# Patient Record
Sex: FEMALE | Race: WHITE | URBAN - NONMETROPOLITAN AREA
[De-identification: names, ages, dates, MRNs, and addresses within clinical notes are randomized per-mention and may not be internally consistent; named-entity substitution may affect disease eponyms.]

---
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
## 2025-02-27 ENCOUNTER — HOSPITAL ENCOUNTER (OUTPATIENT)
Age: 20
Discharge: HOME | End: 2025-02-27
Payer: COMMERCIAL

## 2025-02-27 LAB — HCG UR QL: NEGATIVE

## 2025-02-27 PROCEDURE — 81025 URINE PREGNANCY TEST: CPT

## 2025-04-04 ENCOUNTER — APPOINTMENT (OUTPATIENT)
Dept: GENERAL RADIOLOGY | Age: 20
End: 2025-04-04
Payer: COMMERCIAL

## 2025-04-04 ENCOUNTER — HOSPITAL ENCOUNTER (EMERGENCY)
Age: 20
Discharge: HOME | End: 2025-04-04
Payer: COMMERCIAL

## 2025-04-04 VITALS
DIASTOLIC BLOOD PRESSURE: 45 MMHG | HEIGHT: 63 IN | WEIGHT: 225 LBS | SYSTOLIC BLOOD PRESSURE: 103 MMHG | BODY MASS INDEX: 39.87 KG/M2 | HEART RATE: 49 BPM | RESPIRATION RATE: 18 BRPM | OXYGEN SATURATION: 90 % | TEMPERATURE: 97.7 F

## 2025-04-04 DIAGNOSIS — N39.0 URINARY TRACT INFECTION WITHOUT HEMATURIA, SITE UNSPECIFIED: ICD-10-CM

## 2025-04-04 DIAGNOSIS — R42 LIGHTHEADED: Primary | ICD-10-CM

## 2025-04-04 LAB
ALBUMIN SERPL BCG-MCNC: 4.1 G/DL (ref 3.4–4.9)
ALP SERPL-CCNC: 112 U/L (ref 38–126)
ALT SERPL W/O P-5'-P-CCNC: 27 U/L (ref 10–35)
ANION GAP SERPL CALC-SCNC: 9 MEQ/L (ref 8–16)
AST SERPL-CCNC: 26 U/L (ref 10–35)
B-HCG SERPL QL: NEGATIVE
BACTERIA URNS QL MICRO: ABNORMAL /HPF
BASOPHILS ABSOLUTE: 0 THOU/MM3 (ref 0–0.1)
BASOPHILS NFR BLD AUTO: 0.2 %
BILIRUB SERPL-MCNC: 0.2 MG/DL (ref 0.3–1.2)
BILIRUB UR QL STRIP.AUTO: NEGATIVE
BUN SERPL-MCNC: 12 MG/DL (ref 8–23)
CALCIUM SERPL-MCNC: 9.7 MG/DL (ref 8.6–10)
CASTS #/AREA URNS LPF: ABNORMAL /LPF
CASTS 2: ABNORMAL /LPF
CHARACTER UR: ABNORMAL
CHLORIDE SERPL-SCNC: 104 MEQ/L (ref 98–111)
CO2 SERPL-SCNC: 26 MEQ/L (ref 22–29)
COLOR, UA: YELLOW
CREAT SERPL-MCNC: 0.8 MG/DL (ref 0.5–0.9)
CRYSTALS URNS MICRO: ABNORMAL
DEPRECATED RDW RBC AUTO: 36.9 FL (ref 35–45)
EKG ATRIAL RATE: 54 BPM
EKG P AXIS: 34 DEGREES
EKG P-R INTERVAL: 142 MS
EKG Q-T INTERVAL: 396 MS
EKG QRS DURATION: 82 MS
EKG QTC CALCULATION (BAZETT): 375 MS
EKG R AXIS: 26 DEGREES
EKG T AXIS: 18 DEGREES
EKG VENTRICULAR RATE: 54 BPM
EOSINOPHIL NFR BLD AUTO: 1.7 %
EOSINOPHILS ABSOLUTE: 0.1 THOU/MM3 (ref 0–0.4)
EPITHELIAL CELLS, UA: ABNORMAL /HPF
ERYTHROCYTE [DISTWIDTH] IN BLOOD BY AUTOMATED COUNT: 12.6 % (ref 11.5–14.5)
GFR SERPL CREATININE-BSD FRML MDRD: > 90 ML/MIN/1.73M2
GLUCOSE SERPL-MCNC: 89 MG/DL (ref 74–109)
GLUCOSE UR QL STRIP.AUTO: NEGATIVE MG/DL
HCG UR QL: NEGATIVE
HCT VFR BLD AUTO: 37.2 % (ref 37–47)
HGB BLD-MCNC: 12.6 GM/DL (ref 12–16)
HGB UR QL STRIP.AUTO: NEGATIVE
IMM GRANULOCYTES # BLD AUTO: 0.02 THOU/MM3 (ref 0–0.07)
IMM GRANULOCYTES NFR BLD AUTO: 0.3 %
KETONES UR QL STRIP.AUTO: NEGATIVE
LYMPHOCYTES ABSOLUTE: 1.7 THOU/MM3 (ref 1–4.8)
LYMPHOCYTES NFR BLD AUTO: 27.1 %
MCH RBC QN AUTO: 27.8 PG (ref 26–33)
MCHC RBC AUTO-ENTMCNC: 33.9 GM/DL (ref 32.2–35.5)
MCV RBC AUTO: 82.1 FL (ref 81–99)
MISCELLANEOUS 2: ABNORMAL
MONOCYTES ABSOLUTE: 0.5 THOU/MM3 (ref 0.4–1.3)
MONOCYTES NFR BLD AUTO: 8.4 %
NEUTROPHILS ABSOLUTE: 4 THOU/MM3 (ref 1.8–7.7)
NEUTROPHILS NFR BLD AUTO: 62.3 %
NITRITE UR QL STRIP: NEGATIVE
NRBC BLD AUTO-RTO: 0 /100 WBC
NT-PROBNP SERPL IA-MCNC: < 36 PG/ML (ref 0–124)
OSMOLALITY SERPL CALC.SUM OF ELEC: 276.8 MOSMOL/KG (ref 275–300)
PH UR STRIP.AUTO: 6 [PH] (ref 5–9)
PLATELET # BLD AUTO: 225 THOU/MM3 (ref 130–400)
PMV BLD AUTO: 9.5 FL (ref 9.4–12.4)
POTASSIUM SERPL-SCNC: 4.3 MEQ/L (ref 3.5–5.2)
PROT SERPL-MCNC: 6.6 G/DL (ref 6.4–8.3)
PROT UR STRIP.AUTO-MCNC: NEGATIVE MG/DL
RBC # BLD AUTO: 4.53 MILL/MM3 (ref 4.2–5.4)
RBC URINE: ABNORMAL /HPF
RENAL EPI CELLS #/AREA URNS HPF: ABNORMAL /[HPF]
SODIUM SERPL-SCNC: 139 MEQ/L (ref 135–145)
SP GR UR REFRACT.AUTO: 1.02 (ref 1–1.03)
TROPONIN, HIGH SENSITIVITY: < 6 NG/L (ref 0–12)
UROBILINOGEN, URINE: 0.2 EU/DL (ref 0–1)
WBC # BLD AUTO: 6.4 THOU/MM3 (ref 4.8–10.8)
WBC #/AREA URNS HPF: ABNORMAL /HPF
WBC #/AREA URNS HPF: ABNORMAL /[HPF]
YEAST LIKE FUNGI URNS QL MICRO: ABNORMAL

## 2025-04-04 PROCEDURE — 80053 COMPREHEN METABOLIC PANEL: CPT

## 2025-04-04 PROCEDURE — 84703 CHORIONIC GONADOTROPIN ASSAY: CPT

## 2025-04-04 PROCEDURE — 85025 COMPLETE CBC W/AUTO DIFF WBC: CPT

## 2025-04-04 PROCEDURE — 71046 X-RAY EXAM CHEST 2 VIEWS: CPT

## 2025-04-04 PROCEDURE — 93010 ELECTROCARDIOGRAM REPORT: CPT | Performed by: INTERNAL MEDICINE

## 2025-04-04 PROCEDURE — 99285 EMERGENCY DEPT VISIT HI MDM: CPT

## 2025-04-04 PROCEDURE — 81001 URINALYSIS AUTO W/SCOPE: CPT

## 2025-04-04 PROCEDURE — 83880 ASSAY OF NATRIURETIC PEPTIDE: CPT

## 2025-04-04 PROCEDURE — 36415 COLL VENOUS BLD VENIPUNCTURE: CPT

## 2025-04-04 PROCEDURE — 84484 ASSAY OF TROPONIN QUANT: CPT

## 2025-04-04 PROCEDURE — 93005 ELECTROCARDIOGRAM TRACING: CPT

## 2025-04-04 PROCEDURE — 81025 URINE PREGNANCY TEST: CPT

## 2025-04-04 RX ORDER — CEPHALEXIN 500 MG/1
500 CAPSULE ORAL 2 TIMES DAILY
Qty: 10 CAPSULE | Refills: 0 | Status: SHIPPED | OUTPATIENT
Start: 2025-04-04 | End: 2025-04-09

## 2025-04-04 ASSESSMENT — PAIN - FUNCTIONAL ASSESSMENT: PAIN_FUNCTIONAL_ASSESSMENT: NONE - DENIES PAIN

## 2025-04-04 NOTE — ED TRIAGE NOTES
Patient to room 7 from triage for complaint of feeling lightheaded on the way to work today. 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  Patient reports that she has passed out at work before with similar symptoms.  Patient states that she did have a heart monitor completed and followed up with Dr. Hoffmann but nothing was discovered.  Patient denies any further symptoms.

## 2025-04-04 NOTE — ED NOTES
While speaking with patient she was noted to may down to the 40s.  Patient denies feeling symptomatic but states that this happens frequently.  Provider updated.

## 2025-04-04 NOTE — DISCHARGE INSTRUCTIONS
Sure to follow-up with your family doctor for further workup if need be.  If this continues to be a problem then a cardiac stress test and/or an echocardiogram could be appropriate.  Otherwise take the antibiotics as prescribed for UTI.    If you ever develop dizziness, chest pain, difficulty breathing, palpitations, or any other serious concerns then return to the ED immediately.

## 2025-04-04 NOTE — ED PROVIDER NOTES
[facility] EMERGENCY DEPARTMENT      EMERGENCY MEDICINE     Pt Name: Stephanie Pineda  MRN: [id number]  Birthdate 2005  Date of evaluation: 4/4/2025  Provider: Elver Benitez PA-C    CHIEF COMPLAINT       Chief Complaint   Patient presents with    Tachycardia     HISTORY OF PRESENT ILLNESS   Stephanie Pineda is a pleasant 19 y.o. female who presents to the emergency department from from home, as a walk in to the ED lobby for evaluation of lightheadedness.    Patient reports to the ED for lightheadedness that started around 6:50 AM and lasted 30 minutes.  Patient denies changes in vision, nausea vomiting, chest pain, difficulty breathing.  However she does state that her arms were \"Jell-O\".  Patient denies numbness and tingling in the arms.  Patient denies heart problems.  She states that last May she had a history of syncope in which she is seeing Dr. Shun valles.  He was given a 24-hour Holter monitor with no acute findings.  Patient denies fever chills, constipation/diarrhea, abdominal pain, recent injuries, dysuria/polyuria/nocturia.  Patient is up-to-date on immunizations.  Patient is unsure of her pregnancy status.    PASTMEDICAL HISTORY     Past Medical History:   Diagnosis Date    Childhood obesity, BMI  percentile     Generalized anxiety disorder     Iron deficiency anemia, unspecified     Palpitations     Skin tag        Patient Active Problem List   Diagnosis Code    Intermittent palpitations R00.2    Tachycardia R00.0    Chest pain, atypical R07.89    Dizziness on standing and sitting R42     SURGICAL HISTORY     History reviewed. No pertinent surgical history.    CURRENT MEDICATIONS       Previous Medications    CHOLECALCIFEROL (VITAMIN D3 PO)    Take by mouth    FERROUS SULFATE (IRON 325) 325 (65 FE) MG TABLET    Take 1 tablet by mouth daily    HYDROXYZINE HCL (ATARAX) 25 MG TABLET    Take 1 tablet by mouth every 8 hours as needed for Anxiety    SERTRALINE (ZOLOFT) 25 MG TABLET    Take 1

## 2025-04-22 NOTE — PROGRESS NOTES
[facility] [place] [facility]  [facility] CARDIOLOGY  [address]  [address]  [facility] [address]  Dept: [phone]  Dept Fax: [phone]  Loc: [phone]    Visit Date: 4/24/2025  Ms. Pineda is a 19 y.o. female who presented for:  Post ER visit  Tachycardia  Lightheadedness   HPI:   Stephanie Pineda is a pleasant 19 y.o. female who  has a past medical history of Childhood obesity, BMI  percentile, Generalized anxiety disorder, Iron deficiency anemia, unspecified, Palpitations, and Skin tag. Her FH is positive for HTN. Patient is followed by Dr Hoffmann and had prior evaluation for complaints that palpitations, dizziness. Tilt table test 11/2023 was unremarkable. Echocardiogram 11/2023 revealed an EF of 60-65%. Cardiac event monitor 10/2023 revealed normal sinus rhythm. Exercise EKG stress test 12/2023 was negative for ischemia. TSH 2.71. She was referred back to cardiology after she was seen in ER for lightheadedness. EKG revealed sinus bradycardia, HR 54. HS Troponin <6. The patient reports occasional episodes of variable heart rate, palpitations. Has occasional chest discomfort, atypical, only when \"her heart rate is low\". Has dizziness (when her heart rate is high). She had no syncope since 5/2024.     Current Outpatient Medications:     Cholecalciferol (VITAMIN D3 PO), Take by mouth, Disp: , Rfl:     ferrous sulfate (IRON 325) 325 (65 Fe) MG tablet, Take 1 tablet by mouth daily, Disp: , Rfl:     hydrOXYzine HCl (ATARAX) 25 MG tablet, Take 1 tablet by mouth every 8 hours as needed for Anxiety, Disp: , Rfl:     sertraline (ZOLOFT) 25 MG tablet, Take 1 tablet by mouth daily, Disp: , Rfl:     Past Medical History  Stephanie  has a past medical history of Childhood obesity, BMI  percentile, Generalized anxiety disorder, Iron deficiency anemia, unspecified, Palpitations, and Skin tag.    Social History  Stephanie  reports that she is a non-smoker but has been exposed to tobacco

## 2025-04-24 ENCOUNTER — OFFICE VISIT (OUTPATIENT)
Dept: CARDIOLOGY CLINIC | Age: 20
End: 2025-04-24
Payer: COMMERCIAL

## 2025-04-24 VITALS
WEIGHT: 232.2 LBS | SYSTOLIC BLOOD PRESSURE: 102 MMHG | HEIGHT: 63 IN | DIASTOLIC BLOOD PRESSURE: 58 MMHG | HEART RATE: 56 BPM | BODY MASS INDEX: 41.14 KG/M2

## 2025-04-24 DIAGNOSIS — R00.2 PALPITATIONS: Primary | ICD-10-CM

## 2025-04-24 PROCEDURE — 99214 OFFICE O/P EST MOD 30 MIN: CPT | Performed by: INTERNAL MEDICINE

## 2025-04-24 NOTE — PROGRESS NOTES
New to provider- previously seen Shun 2023  EKG 4-4-25  C/o bradycardia, tachycardia, syncope, lightheaded, weakness, sob when HR is low, palpitations, headaches  Denies chest pain, BLE  Lowest HR - 44  Highest HR - 130  Mom states her BP has been high and low as well

## 2025-04-24 NOTE — PATIENT INSTRUCTIONS
Your nurses today were Britni  Your provider today was Dr. Trejo   Phone number: [phone]

## 2025-04-28 ENCOUNTER — PATIENT MESSAGE (OUTPATIENT)
Dept: CARDIOLOGY CLINIC | Age: 20
End: 2025-04-28

## 2025-04-28 ENCOUNTER — TELEPHONE (OUTPATIENT)
Dept: CARDIOLOGY CLINIC | Age: 20
End: 2025-04-28

## 2025-04-28 DIAGNOSIS — R07.89 CHEST PAIN, ATYPICAL: ICD-10-CM

## 2025-04-28 DIAGNOSIS — R00.0 TACHYCARDIA: ICD-10-CM

## 2025-04-28 DIAGNOSIS — R42 DIZZINESS ON STANDING: ICD-10-CM

## 2025-04-28 DIAGNOSIS — R00.2 PALPITATIONS: Primary | ICD-10-CM

## 2025-04-28 NOTE — TELEPHONE ENCOUNTER
Please see message below from patient     Hello, , I was wondering if we could possibly do the heart monitor for a longer amount of time rather than just 48 hours.

## 2025-05-06 ENCOUNTER — HOSPITAL ENCOUNTER (OUTPATIENT)
Age: 20
Discharge: HOME | End: 2025-05-08
Attending: INTERNAL MEDICINE
Payer: COMMERCIAL

## 2025-05-06 DIAGNOSIS — R00.0 TACHYCARDIA: ICD-10-CM

## 2025-05-06 DIAGNOSIS — R07.89 CHEST PAIN, ATYPICAL: ICD-10-CM

## 2025-05-06 DIAGNOSIS — R00.2 PALPITATIONS: ICD-10-CM

## 2025-05-06 DIAGNOSIS — R42 DIZZINESS ON STANDING: ICD-10-CM

## 2025-05-06 PROCEDURE — 93270 REMOTE 30 DAY ECG REV/REPORT: CPT

## 2025-05-20 ENCOUNTER — RESULTS FOLLOW-UP (OUTPATIENT)
Dept: CARDIOLOGY | Age: 20
End: 2025-05-20